# Patient Record
Sex: FEMALE | Race: WHITE
[De-identification: names, ages, dates, MRNs, and addresses within clinical notes are randomized per-mention and may not be internally consistent; named-entity substitution may affect disease eponyms.]

---

## 2021-04-14 ENCOUNTER — APPOINTMENT (OUTPATIENT)
Age: 26
End: 2021-04-14
Payer: COMMERCIAL

## 2021-04-14 PROCEDURE — 0001A: CPT

## 2021-05-05 ENCOUNTER — APPOINTMENT (OUTPATIENT)
Age: 26
End: 2021-05-05
Payer: COMMERCIAL

## 2021-05-05 PROBLEM — Z00.00 ENCOUNTER FOR PREVENTIVE HEALTH EXAMINATION: Status: ACTIVE | Noted: 2021-05-05

## 2021-05-05 PROCEDURE — 0002A: CPT

## 2023-05-01 ENCOUNTER — APPOINTMENT (OUTPATIENT)
Dept: ORTHOPEDIC SURGERY | Facility: CLINIC | Age: 28
End: 2023-05-01
Payer: COMMERCIAL

## 2023-05-01 VITALS — WEIGHT: 117 LBS | HEIGHT: 64 IN | BODY MASS INDEX: 19.97 KG/M2

## 2023-05-01 PROCEDURE — 73140 X-RAY EXAM OF FINGER(S): CPT | Mod: RT

## 2023-05-01 PROCEDURE — 99203 OFFICE O/P NEW LOW 30 MIN: CPT

## 2023-05-11 NOTE — IMAGING
[de-identified] : Right thumb\par Moderately swollen in the fingertip\par No subungual hematoma noted\par + EPL/FPL\par IP motion limited by pain and swelling\par Opposes to the level of the long finger tip\par SILT throughout\par wwp

## 2023-05-11 NOTE — DISCUSSION/SUMMARY
[de-identified] : I reviewed the nature of this injury with the patient in layman's terms\par I discussed potential treatment options including both operative and nonoperative\par We discussed the roles for immobilization versus operative treatment\par Given the nondisplaced nature of this fracture would recommend continued immobilization in a Stax splint\par NSAIDs as needed for pain\par I will see her again in 2 more weeks for repeat x-rays and likely progressive range of motion at that time
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
## 2023-05-15 ENCOUNTER — APPOINTMENT (OUTPATIENT)
Dept: ORTHOPEDIC SURGERY | Facility: CLINIC | Age: 28
End: 2023-05-15
Payer: COMMERCIAL

## 2023-05-15 DIAGNOSIS — S62.521A DISPLACED FRACTURE OF DISTAL PHALANX OF RIGHT THUMB, INITIAL ENCOUNTER FOR CLOSED FRACTURE: ICD-10-CM

## 2023-05-15 PROCEDURE — 73140 X-RAY EXAM OF FINGER(S): CPT | Mod: RT

## 2023-05-15 PROCEDURE — 99212 OFFICE O/P EST SF 10 MIN: CPT

## 2023-05-16 NOTE — DISCUSSION/SUMMARY
[de-identified] : I reviewed the nature of this injury and prognosis with the patient in layman's terms\par Reviewed weaning procedure from Stax splint, ROM\par NSAIDs as needed for pain\par f/u prn

## 2023-05-16 NOTE — DATA REVIEWED
[FreeTextEntry1] : 3 views of the right thumb: Nondisplaced fracture of the distal phalanx, no interval displacement from prior

## 2023-05-16 NOTE — IMAGING
[de-identified] : Right thumb\par Minimally swollen in the fingertip, mildly TTP\par No subungual hematoma noted\par + EPL/FPL\par SILT throughout\par wwp

## 2023-05-16 NOTE — HISTORY OF PRESENT ILLNESS
[6] : 6 [Dull/Aching] : dull/aching [Throbbing] : throbbing [Intermittent] : intermittent [Bending forward] : bending forward [Extending back] : extending back [de-identified] : 27-year-old right-hand-dominant female returns today for repeat evaluation of her right thumb distal phalanx fracture being treated nonoperatively in a stax splint. She has minimall pain. No new complaints. [] : no [de-identified] : 04/26/2023 [de-identified] : Urgent care